# Patient Record
Sex: MALE | Race: WHITE | NOT HISPANIC OR LATINO
[De-identification: names, ages, dates, MRNs, and addresses within clinical notes are randomized per-mention and may not be internally consistent; named-entity substitution may affect disease eponyms.]

---

## 2024-09-16 PROBLEM — Z00.00 ENCOUNTER FOR PREVENTIVE HEALTH EXAMINATION: Status: ACTIVE | Noted: 2024-09-16

## 2024-09-18 ENCOUNTER — APPOINTMENT (OUTPATIENT)
Dept: PAIN MANAGEMENT | Facility: CLINIC | Age: 74
End: 2024-09-18

## 2024-09-18 VITALS
HEART RATE: 86 BPM | HEIGHT: 71 IN | WEIGHT: 185 LBS | DIASTOLIC BLOOD PRESSURE: 81 MMHG | BODY MASS INDEX: 25.9 KG/M2 | SYSTOLIC BLOOD PRESSURE: 122 MMHG | OXYGEN SATURATION: 97 %

## 2024-09-18 DIAGNOSIS — M48.062 SPINAL STENOSIS, LUMBAR REGION WITH NEUROGENIC CLAUDICATION: ICD-10-CM

## 2024-09-18 DIAGNOSIS — M54.16 RADICULOPATHY, LUMBAR REGION: ICD-10-CM

## 2024-09-18 PROCEDURE — 99205 OFFICE O/P NEW HI 60 MIN: CPT

## 2024-09-18 RX ORDER — ATORVASTATIN CALCIUM 20 MG/1
20 TABLET, FILM COATED ORAL
Refills: 0 | Status: ACTIVE | COMMUNITY

## 2024-09-18 NOTE — ASSESSMENT
[FreeTextEntry1] : 74M presenting for initial evaluation of left lower back and leg pain.  Impression - Patient presents with lower back and left leg pain of 6 months duration, worse with ambulation and interfering with sleep, refractory to over the counter medications. Exam is notable for subjective weakness in knee extension, and decreased left patellar and left achilles DTRs. MRI L-spine showed multilevel disc bulges most severe at L3-4. He is a good candidate for lumbar interlaminar LINDSAY at L3-4.  Plan - Will submit for auth for lumbar interlaminar LINDSAY at L3-4

## 2024-09-18 NOTE — PHYSICAL EXAM
[de-identified] : General:  Appearance is consistent with chronologic age. Cognitive/Language:  Awake, alert, and oriented to person, place, time and date Respiratory: Non-labored breathing, no audible wheezes Inspection: normal muscle bulk without asymmetry and normal spine curvature Motor examination:  Upper Extremities: L 5/5, R 5/5; Lower extremities: L 5/5, R 5/5 Sensory examination:   Intact to light touch throughout Reflexes:   1+ patella and achilles. 2+ on the right Gait: narrow based and normal Special tests: negative SLR

## 2024-09-18 NOTE — HISTORY OF PRESENT ILLNESS
[FreeTextEntry1] : Patient is a 74M presenting for initial evaluation of left lower back and leg pain - Started about 6 months ago, no inciting event - First symptom was in the left anterior thigh, sometimes radiating below the knee - Worse with activity, worse when changing positions - Lower back pain is present in the morning but improves throughout the day. Sometimes present with prolonged sitting. Overall "light" across lower back - 1 year ago was diagnosed with a left gluteal tear after noticing posterior thigh pain. Performed PT, which has helped. ALso had PRP - Has not had any injections or PT for new symptoms

## 2024-10-02 ENCOUNTER — APPOINTMENT (OUTPATIENT)
Dept: PAIN MANAGEMENT | Facility: CLINIC | Age: 74
End: 2024-10-02
Payer: COMMERCIAL

## 2024-10-02 PROCEDURE — 62323 NJX INTERLAMINAR LMBR/SAC: CPT

## 2024-10-03 ENCOUNTER — NON-APPOINTMENT (OUTPATIENT)
Age: 74
End: 2024-10-03

## 2024-10-06 NOTE — PROCEDURE
[FreeTextEntry1] : Interlaminar lumbar epidural steroid injection  L3-4   The potential benefits as well as rare but possible risks were reviewed with the patient.  These risks including infection including epidural abscess, meningitis, osteomyelitis, and discitis, bleeding including epidural hematoma, nerve injury, paralysis, failure to relieve pain or worse pain, headache, pneumothorax, elevated blood sugars, allergic reactions, adverse reactions to medications, vasovagal reactions, falls, etc. Following that discussion, all questions were again answered to the patients satisfaction, the patient stated their verbal understanding and written consent was obtained.   After obtaining consent, pre-procedure blood pressure and heart rate were stable and recorded in the nursing record. Standard monitors were applied. The patient was placed in the prone position. The lumbosacral area was widely prepped with chloroprep and draped in sterile fashion. Fluoroscopic guidance was used to identify the desired interlaminar space and for needle placement. Subcutaneous 0.5% lidocaine was used to anesthetize the skin overlying the target. A 20-gauge Tuohy needle was advanced to the epidural space using loss of resistance to air technique and AP / contralateral oblique views under fluoroscopy. There was no evidence of heme or CSF and no paresthesias were elicited with needle placement.   Confirmation of epidural needle placement was performed with 0.5 cc of omnipaque Next 3 ml preservative free normal saline and 10 mg dexamethasone was administered epidurally with no pain elicited on injection. The needle was removed, skin cleansed with alcohol and a sterile bandage was applied. The patient tolerated the procedure well and no complications were encountered. Following the procedure, the patient's vital signs were stable. The patient was discharged home in good condition with post-procedural instructions.   Time Out: Immediately prior to the procedure, the following was verbally confirmed that there is a consent form and that the correct patient, planned procedure, site and side are consistent with documentation and that necessary equipment and/or blood products are available prior to the start of the case.   Complications: none EBL: <5 cc NO DEXAMETHASONE WASTE  Plan: - Patient to follow up in 2 weeks

## 2024-10-15 ENCOUNTER — APPOINTMENT (OUTPATIENT)
Dept: PAIN MANAGEMENT | Facility: CLINIC | Age: 74
End: 2024-10-15
Payer: COMMERCIAL

## 2024-10-15 VITALS
WEIGHT: 185 LBS | DIASTOLIC BLOOD PRESSURE: 74 MMHG | SYSTOLIC BLOOD PRESSURE: 118 MMHG | HEART RATE: 79 BPM | BODY MASS INDEX: 25.9 KG/M2 | OXYGEN SATURATION: 98 % | HEIGHT: 71 IN

## 2024-10-15 DIAGNOSIS — M48.062 SPINAL STENOSIS, LUMBAR REGION WITH NEUROGENIC CLAUDICATION: ICD-10-CM

## 2024-10-15 DIAGNOSIS — M54.16 RADICULOPATHY, LUMBAR REGION: ICD-10-CM

## 2024-10-15 PROCEDURE — 99215 OFFICE O/P EST HI 40 MIN: CPT

## 2024-10-23 ENCOUNTER — APPOINTMENT (OUTPATIENT)
Dept: PAIN MANAGEMENT | Facility: CLINIC | Age: 74
End: 2024-10-23

## 2024-10-23 VITALS
BODY MASS INDEX: 25.9 KG/M2 | SYSTOLIC BLOOD PRESSURE: 121 MMHG | WEIGHT: 185 LBS | HEART RATE: 79 BPM | HEIGHT: 71 IN | OXYGEN SATURATION: 98 % | DIASTOLIC BLOOD PRESSURE: 76 MMHG

## 2024-10-23 DIAGNOSIS — M54.16 RADICULOPATHY, LUMBAR REGION: ICD-10-CM

## 2024-10-23 DIAGNOSIS — M48.062 SPINAL STENOSIS, LUMBAR REGION WITH NEUROGENIC CLAUDICATION: ICD-10-CM

## 2024-10-23 PROCEDURE — 62323 NJX INTERLAMINAR LMBR/SAC: CPT

## 2024-11-05 ENCOUNTER — APPOINTMENT (OUTPATIENT)
Dept: PAIN MANAGEMENT | Facility: CLINIC | Age: 74
End: 2024-11-05

## 2024-11-05 DIAGNOSIS — M54.50 LOW BACK PAIN, UNSPECIFIED: ICD-10-CM

## 2024-11-05 DIAGNOSIS — M47.816 SPONDYLOSIS W/OUT MYELOPATHY OR RADICULOPATHY, LUMBAR REGION: ICD-10-CM

## 2024-11-05 PROCEDURE — 99215 OFFICE O/P EST HI 40 MIN: CPT

## 2024-12-06 ENCOUNTER — APPOINTMENT (OUTPATIENT)
Dept: PAIN MANAGEMENT | Facility: CLINIC | Age: 74
End: 2024-12-06

## 2024-12-06 PROCEDURE — 64493 INJ PARAVERT F JNT L/S 1 LEV: CPT | Mod: LT

## 2024-12-06 PROCEDURE — 64494 INJ PARAVERT F JNT L/S 2 LEV: CPT | Mod: LT

## 2024-12-10 ENCOUNTER — NON-APPOINTMENT (OUTPATIENT)
Age: 74
End: 2024-12-10

## 2024-12-10 ENCOUNTER — APPOINTMENT (OUTPATIENT)
Dept: PHYSICAL MEDICINE AND REHAB | Facility: CLINIC | Age: 74
End: 2024-12-10
Payer: COMMERCIAL

## 2024-12-10 VITALS
HEART RATE: 92 BPM | HEIGHT: 71 IN | DIASTOLIC BLOOD PRESSURE: 67 MMHG | SYSTOLIC BLOOD PRESSURE: 122 MMHG | BODY MASS INDEX: 26.6 KG/M2 | WEIGHT: 190 LBS | RESPIRATION RATE: 18 BRPM

## 2024-12-10 DIAGNOSIS — Z78.9 OTHER SPECIFIED HEALTH STATUS: ICD-10-CM

## 2024-12-10 DIAGNOSIS — Z86.39 PERSONAL HISTORY OF OTHER ENDOCRINE, NUTRITIONAL AND METABOLIC DISEASE: ICD-10-CM

## 2024-12-10 DIAGNOSIS — M47.816 SPONDYLOSIS W/OUT MYELOPATHY OR RADICULOPATHY, LUMBAR REGION: ICD-10-CM

## 2024-12-10 DIAGNOSIS — M54.50 LOW BACK PAIN, UNSPECIFIED: ICD-10-CM

## 2024-12-10 PROCEDURE — 99203 OFFICE O/P NEW LOW 30 MIN: CPT

## 2024-12-12 ENCOUNTER — APPOINTMENT (OUTPATIENT)
Dept: PHYSICAL MEDICINE AND REHAB | Facility: CLINIC | Age: 74
End: 2024-12-12

## 2024-12-20 ENCOUNTER — APPOINTMENT (OUTPATIENT)
Dept: PHYSICAL MEDICINE AND REHAB | Facility: CLINIC | Age: 74
End: 2024-12-20
Payer: COMMERCIAL

## 2024-12-20 VITALS
HEART RATE: 89 BPM | RESPIRATION RATE: 18 BRPM | HEIGHT: 71 IN | BODY MASS INDEX: 26.6 KG/M2 | DIASTOLIC BLOOD PRESSURE: 78 MMHG | SYSTOLIC BLOOD PRESSURE: 130 MMHG | WEIGHT: 190 LBS

## 2024-12-20 DIAGNOSIS — M48.062 SPINAL STENOSIS, LUMBAR REGION WITH NEUROGENIC CLAUDICATION: ICD-10-CM

## 2024-12-20 DIAGNOSIS — G60.8 OTHER HEREDITARY AND IDIOPATHIC NEUROPATHIES: ICD-10-CM

## 2024-12-20 PROCEDURE — 95911 NRV CNDJ TEST 9-10 STUDIES: CPT

## 2024-12-20 PROCEDURE — 95886 MUSC TEST DONE W/N TEST COMP: CPT

## 2024-12-27 ENCOUNTER — APPOINTMENT (OUTPATIENT)
Dept: PAIN MANAGEMENT | Facility: CLINIC | Age: 74
End: 2024-12-27
Payer: COMMERCIAL

## 2024-12-27 VITALS
SYSTOLIC BLOOD PRESSURE: 121 MMHG | WEIGHT: 190 LBS | HEIGHT: 71 IN | BODY MASS INDEX: 26.6 KG/M2 | HEART RATE: 57 BPM | DIASTOLIC BLOOD PRESSURE: 79 MMHG | OXYGEN SATURATION: 98 %

## 2024-12-27 DIAGNOSIS — M54.16 RADICULOPATHY, LUMBAR REGION: ICD-10-CM

## 2024-12-27 DIAGNOSIS — M47.816 SPONDYLOSIS W/OUT MYELOPATHY OR RADICULOPATHY, LUMBAR REGION: ICD-10-CM

## 2024-12-27 DIAGNOSIS — M54.50 LOW BACK PAIN, UNSPECIFIED: ICD-10-CM

## 2024-12-27 PROCEDURE — 99215 OFFICE O/P EST HI 40 MIN: CPT

## 2024-12-27 RX ORDER — PREGABALIN 50 MG/1
50 CAPSULE ORAL
Qty: 30 | Refills: 0 | Status: ACTIVE | COMMUNITY
Start: 2024-12-27 | End: 1900-01-01